# Patient Record
Sex: FEMALE | Race: WHITE | URBAN - METROPOLITAN AREA
[De-identification: names, ages, dates, MRNs, and addresses within clinical notes are randomized per-mention and may not be internally consistent; named-entity substitution may affect disease eponyms.]

---

## 2022-04-21 ENCOUNTER — OFFICE VISIT (OUTPATIENT)
Dept: URBAN - METROPOLITAN AREA CLINIC 43 | Facility: CLINIC | Age: 32
End: 2022-04-21
Payer: MEDICAID

## 2022-04-21 DIAGNOSIS — D44.3 NEOPLASM OF UNCERTAIN BEHAVIOR OF PITUITARY GLAND: Primary | ICD-10-CM

## 2022-04-21 DIAGNOSIS — H16.223 KERATOCONJUNCTIVITIS SICCA, BILATERAL: ICD-10-CM

## 2022-04-21 PROCEDURE — 92133 CPTRZD OPH DX IMG PST SGM ON: CPT | Performed by: OPTOMETRIST

## 2022-04-21 PROCEDURE — 92004 COMPRE OPH EXAM NEW PT 1/>: CPT | Performed by: OPTOMETRIST

## 2022-04-21 PROCEDURE — 92083 EXTENDED VISUAL FIELD XM: CPT | Performed by: OPTOMETRIST

## 2022-04-21 ASSESSMENT — KERATOMETRY
OS: 42.50
OD: 42.00

## 2022-04-21 ASSESSMENT — VISUAL ACUITY
OS: 20/20
OD: 20/20

## 2022-04-21 ASSESSMENT — INTRAOCULAR PRESSURE
OD: 9
OS: 7

## 2022-04-21 NOTE — IMPRESSION/PLAN
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
OCT RNFL today: no swelling or atrophy OU
cont care with endocrinology RTC 6 months for FU/VF (30-2)

## 2022-04-21 NOTE — IMPRESSION/PLAN
Impression: Keratoconjunctivitis sicca, bilateral: M19.516. Plan: Recommend artificial tears at least 4 times a day and gel drop or tear ointment at bedtime.